# Patient Record
Sex: FEMALE | Race: WHITE
[De-identification: names, ages, dates, MRNs, and addresses within clinical notes are randomized per-mention and may not be internally consistent; named-entity substitution may affect disease eponyms.]

---

## 2017-04-26 ENCOUNTER — HOSPITAL ENCOUNTER (OUTPATIENT)
Dept: HOSPITAL 61 - KCIC MRI | Age: 67
Discharge: HOME | End: 2017-04-26
Attending: PODIATRIST
Payer: MEDICARE
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
## 2017-04-26 DIAGNOSIS — M84.375A: Primary | ICD-10-CM
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
## 2017-04-26 PROCEDURE — 73718 MRI LOWER EXTREMITY W/O DYE: CPT

## 2017-04-26 NOTE — [facility]
PROCEDURE 

MRI study of the left foot without contrast 

 

HISTORY 

Metatarsal pain since March 2017. Swelling between the 3rd and 5th digits.

No known injury. 

 

TECHNIQUE 

Noncontrast MRI sequences of the left midfoot were performed in all 3 

planes. 

 

COMPARISON 

None available. 

 

FINDINGS 

There is diffuse edema of the 3rd metatarsal bone with relative sparing of

the proximal and distal epiphysis. There is a healing stress fracture of 

the distal shaft of the 3rd metatarsal bone with periosteal reaction. 

There is associated soft tissue edema. Alignment is normal. There is mild 

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 

joint. There is mild primary degenerative osteoarthritis of the 1st and 

4th tarsal metatarsal joints. The flexor and extensor tendons are intact. 

No tenosynovitis is seen. 

 

IMPRESSION 

There is a nondisplaced subacute healing stress fracture of the distal 

shaft of the 3rd metatarsal bone. There is diffuse edema of the metatarsal

bone with relative sparing of the proximal and distal epiphysis. 

 

Electronically signed by: Shay Mckeon MD (Apr 26, 2017 15:28:21)

## 2017-12-08 ENCOUNTER — HOSPITAL ENCOUNTER (OUTPATIENT)
Dept: HOSPITAL 61 - KCIC MAMMO | Age: 67
Discharge: HOME | End: 2017-12-08
Attending: OBSTETRICS & GYNECOLOGY
Payer: MEDICARE

## 2017-12-08 ENCOUNTER — HOSPITAL ENCOUNTER (OUTPATIENT)
Dept: HOSPITAL 61 - KCIC MRI | Age: 67
Discharge: HOME | End: 2017-12-08
Attending: ORTHOPAEDIC SURGERY
Payer: MEDICARE

## 2017-12-08 DIAGNOSIS — Z12.31: Primary | ICD-10-CM

## 2017-12-08 DIAGNOSIS — M75.101: Primary | ICD-10-CM

## 2017-12-08 PROCEDURE — 73221 MRI JOINT UPR EXTREM W/O DYE: CPT

## 2017-12-08 PROCEDURE — 77063 BREAST TOMOSYNTHESIS BI: CPT

## 2017-12-08 NOTE — [facility]
MR of the right shoulder

 

Indication: Right shoulder pain. Patient fell October 2017. Decreased 

range of motion.

 

Technique: Standard multiplanar sequences are obtained.

 

Findings:

 

Acromioclavicular joint: Mildly degenerative. No significant undersurface 

mass effect.

 

Rotator cuff: Thickening and hyperintense signal compatible with 

tendinosis. Small linear undersurface tear of the supraspinatus tendon, 

crosses 50 percent of the tendon with. There is a very tiny component of 

this tear which appears to extend all the way through to the bursal 

surface where it measures 1 mm thick, coronal series 8, image 10. No 

retraction. Mild fluid in the subdeltoid bursa.

 

Glenohumeral cartilage: No acute defect or advanced DJD.

Fluid: Small amount of joint fluid.

Labrum: Posterosuperior labral tear.

 

Biceps tendon: Mild tendinosis.

 

Bones: No lesion or acute fracture.

 

Soft tissue: No acute findings.

 

Impression:

1. Rotator cuff tendinosis. There is a small undersurface tear of the 

supraspinatus tendon, the bulk of which is about 50 percent deep. However,

there is a very narrow 1 mm component which does appear to extend all 

full-thickness through the bursal surface tissue. No retraction.

2. Small posterosuperior labral tear.

3. Biceps tendinosis.

 

Electronically signed by: Jordy Queen MD (12/8/2017 3:25 PM) [facility]

## 2017-12-08 NOTE — [facility]
DATE: 12/8/2017



EXAM: MAMMO VIRGILIO SCREENING BILATERAL



HISTORY: Routine screening



COMPARISON: 7/28/2016



This study was interpreted with the benefit of Computerized Aided Detection

(CAD).



The breast parenchyma shows scattered fibroglandular densities. Breast

parenchyma level B.



FINDINGS: 2-D and 3-D tomosynthesis imaging was performed in CC and MLO

projections. No new or enlarging breast densities are seen. Unchanged benign

type calcifications are present. No suspicious microcalcifications have

developed.  





IMPRESSION: Stable mammograms without evidence of malignancy.







BI-RADS CATEGORY: 2 BENIGN FINDING(S)



RECOMMENDED FOLLOW-UP: 12M 12 MONTH FOLLOW-UP



PQRS compliance statement: Patient information was entered into a reminder

system with a target due date     for the next mammogram.



Mammography is a sensitive method for finding small breast cancers, but it

does not detect them all and is not a substitute for careful clinical

examination.  A negative mammogram does not negate a clinically suspicious

finding and should not result in delay in biopsying a clinically suspicious

abnormality.



"Our facility is accredited by the American College of [employer] Mammography

Program."

## 2017-12-19 ENCOUNTER — HOSPITAL ENCOUNTER (OUTPATIENT)
Dept: HOSPITAL 61 - KCIC | Age: 67
Discharge: HOME | End: 2017-12-19
Attending: FAMILY MEDICINE
Payer: MEDICARE

## 2017-12-19 DIAGNOSIS — J43.9: Primary | ICD-10-CM

## 2017-12-19 PROCEDURE — 71020: CPT

## 2017-12-19 NOTE — [facility]
CHEST PA   LATERAL

 

History: Chronic cough

 

Comparison: July 21, 2016

 

Findings:

2 views of the chest are submitted. 

There is again likely emphysema. Heart size is stable, within normal 

limits. Previously seen left upper lobe infiltrate is no longer present. 

The there is no new infiltrate or pleural fluid.

 

Impression: 

 

1.  There is no lobar infiltrate.

2. There is emphysema.

3. CT would be more sensitive for detection of pulmonary nodules.

 

Electronically signed by: Gurpreet Babb MD (12/19/2017 4:38 PM) 

[facility]

## 2018-09-27 ENCOUNTER — HOSPITAL ENCOUNTER (OUTPATIENT)
Dept: HOSPITAL 61 - KCIC DEXA | Age: 68
Discharge: HOME | End: 2018-09-27
Attending: PHYSICIAN ASSISTANT
Payer: MEDICARE

## 2018-09-27 DIAGNOSIS — Z13.820: Primary | ICD-10-CM

## 2018-09-27 DIAGNOSIS — J43.9: ICD-10-CM

## 2018-09-27 DIAGNOSIS — Z78.0: ICD-10-CM

## 2018-09-27 PROCEDURE — 77080 DXA BONE DENSITY AXIAL: CPT

## 2018-09-27 NOTE — [facility]
Bone densitometry 9/27/2018 11:59 AM

 

Indication: Osteoporosis screening exam. Postmenopausal.

 

Comparison Study: None.

 

Discussion:   Bone Densitometry was performed with dual photon absorption 

of the lumbar spine and left proximal femur.

 

Lumbar Spine:  Bone average density is 0.988g/cm2 for L1-L4. T-Score is 

-0.5.  

 

Left femoral neck:     Bone average density is 0.965g/cm2.  T-Score is  

0.2. 

 

IMPRESSION: Normal bone mineral density 

 

Note: Definitions established by the World Health Organization:

   Normal: T-score is -1.0 or above.

   Osteopenia: T-score is between -1.0 and -2.5.

   Osteoporosis: T-score is -2.5 or below. 

 

 

 

 

Electronically signed by: Johnson Monreal MD (9/27/2018 2:44 PM) [facility]

## 2019-09-10 ENCOUNTER — HOSPITAL ENCOUNTER (OUTPATIENT)
Dept: HOSPITAL 61 - KCIC MAMMO | Age: 69
Discharge: HOME | End: 2019-09-10
Attending: OBSTETRICS & GYNECOLOGY
Payer: MEDICARE

## 2019-09-10 DIAGNOSIS — Z12.31: Primary | ICD-10-CM

## 2019-09-10 DIAGNOSIS — N64.89: ICD-10-CM

## 2019-09-10 PROCEDURE — 77067 SCR MAMMO BI INCL CAD: CPT

## 2019-09-10 PROCEDURE — 77063 BREAST TOMOSYNTHESIS BI: CPT

## 2019-09-10 NOTE — [facility]
EXAM: Bilateral digital screening mammogram with tomosynthesis.

 

HISTORY: 69-year-old female presents for screening mammography.

 

TECHNIQUE: Full-field digital craniocaudal and mediolateral oblique 2D and

3D tomosynthesis images of both breasts are obtained for evaluation. 

Computer aided detection with b3 bioD software version 9.3 was applied.

 

COMPARISON: 12/8/2017 and 10/21/2015

 

BREAST PARENCHYMAL DENSITY: Level B - Scattered fibroglandular densities.

 

FINDINGS: There is no new suspicious mass, microcalcification or region of

architectural distortion. There is stable slight retraction of the right 

nipple. The nearly 4 year course stability favors benignity.

 

IMPRESSION: BI-RADS Category 2: Benign finding(s). 

 

RECOMMENDATION: Annual mammography is recommended.

 

If your mammogram demonstrates that you have dense breast tissue, which 

could hide abnormalities, and if you have other risk factors for breast 

cancer that have been identified, you might benefit from supplemental 

screening tests that may be suggested by your ordering physician.  Dense 

breast tissue, in and of itself, is a relatively common condition.  This 

information is not provided to cause undue concern, but rather to raise 

your awareness and to promote discussion with your physician regarding the

presence of other risk factors, in addition to dense breast tissue. A 

report of your mammography results will be sent to you and your physician.

You should contact your physician if you have any questions or concerns 

regarding this report.  

 

Mammography is a sensitive method for finding small breast cancers, but it

does not detect them all and is not a substitute for careful clinical 

examination.  A negative mammogram does not negate a clinically suspicious

finding and should not result in delay in biopsying a clinically 

suspicious abnormality.

 

PQRS compliance statement -  Patient information was entered into a 

reminder system with a target due date for the next mammogram. 

 

"Our facility is accredited by the American College of Radiology 

Mammography Program."

 

Electronically signed by: Amira Ayala MD (9/10/2019 3:15 PM) [facility]

## 2022-04-25 ENCOUNTER — HOSPITAL ENCOUNTER (EMERGENCY)
Dept: HOSPITAL 61 - ER | Age: 72
LOS: 1 days | Discharge: HOME | End: 2022-04-26
Payer: MEDICARE

## 2022-04-25 VITALS — BODY MASS INDEX: 28.3 KG/M2 | WEIGHT: 180.34 LBS | HEIGHT: 67 IN

## 2022-04-25 DIAGNOSIS — R11.2: ICD-10-CM

## 2022-04-25 DIAGNOSIS — R42: Primary | ICD-10-CM

## 2022-04-25 LAB
ALBUMIN SERPL-MCNC: 3.6 G/DL (ref 3.4–5)
ALBUMIN/GLOB SERPL: 1.1 {RATIO} (ref 1–1.7)
ALP SERPL-CCNC: 88 U/L (ref 46–116)
ALT SERPL-CCNC: 55 U/L (ref 14–59)
ANION GAP SERPL CALC-SCNC: 5 MMOL/L (ref 6–14)
AST SERPL-CCNC: 36 U/L (ref 15–37)
BASOPHILS # BLD AUTO: 0.1 X10^3/UL (ref 0–0.2)
BASOPHILS NFR BLD: 1 % (ref 0–3)
BILIRUB SERPL-MCNC: 0.2 MG/DL (ref 0.2–1)
BUN SERPL-MCNC: 27 MG/DL (ref 7–20)
BUN/CREAT SERPL: 30 (ref 6–20)
CALCIUM SERPL-MCNC: 8.3 MG/DL (ref 8.5–10.1)
CHLORIDE SERPL-SCNC: 107 MMOL/L (ref 98–107)
CO2 SERPL-SCNC: 28 MMOL/L (ref 21–32)
CREAT SERPL-MCNC: 0.9 MG/DL (ref 0.6–1)
EOSINOPHIL NFR BLD: 0.2 X10^3/UL (ref 0–0.7)
EOSINOPHIL NFR BLD: 3 % (ref 0–3)
ERYTHROCYTE [DISTWIDTH] IN BLOOD BY AUTOMATED COUNT: 14.1 % (ref 11.5–14.5)
GFR SERPLBLD BASED ON 1.73 SQ M-ARVRAT: 61.5 ML/MIN
GLUCOSE SERPL-MCNC: 133 MG/DL (ref 70–99)
HCT VFR BLD CALC: 39.5 % (ref 36–47)
HGB BLD-MCNC: 13.4 G/DL (ref 12–15.5)
LIPASE: 75 U/L (ref 73–393)
LYMPHOCYTES # BLD: 1.6 X10^3/UL (ref 1–4.8)
LYMPHOCYTES NFR BLD AUTO: 25 % (ref 24–48)
MCH RBC QN AUTO: 31 PG (ref 25–35)
MCHC RBC AUTO-ENTMCNC: 34 G/DL (ref 31–37)
MCV RBC AUTO: 92 FL (ref 79–100)
MONO #: 0.4 X10^3/UL (ref 0–1.1)
MONOCYTES NFR BLD: 6 % (ref 0–9)
NEUT #: 4.2 X10^3/UL (ref 1.8–7.7)
NEUTROPHILS NFR BLD AUTO: 65 % (ref 31–73)
PLATELET # BLD AUTO: 159 X10^3/UL (ref 140–400)
POTASSIUM SERPL-SCNC: 4.2 MMOL/L (ref 3.5–5.1)
PROT SERPL-MCNC: 6.9 G/DL (ref 6.4–8.2)
RBC # BLD AUTO: 4.31 X10^6/UL (ref 3.5–5.4)
SODIUM SERPL-SCNC: 140 MMOL/L (ref 136–145)
WBC # BLD AUTO: 6.4 X10^3/UL (ref 4–11)

## 2022-04-25 PROCEDURE — 96361 HYDRATE IV INFUSION ADD-ON: CPT

## 2022-04-25 PROCEDURE — 80307 DRUG TEST PRSMV CHEM ANLYZR: CPT

## 2022-04-25 PROCEDURE — 36415 COLL VENOUS BLD VENIPUNCTURE: CPT

## 2022-04-25 PROCEDURE — 83690 ASSAY OF LIPASE: CPT

## 2022-04-25 PROCEDURE — 96374 THER/PROPH/DIAG INJ IV PUSH: CPT

## 2022-04-25 PROCEDURE — 96372 THER/PROPH/DIAG INJ SC/IM: CPT

## 2022-04-25 PROCEDURE — 80053 COMPREHEN METABOLIC PANEL: CPT

## 2022-04-25 PROCEDURE — 99284 EMERGENCY DEPT VISIT MOD MDM: CPT

## 2022-04-25 PROCEDURE — 85025 COMPLETE CBC W/AUTO DIFF WBC: CPT

## 2022-04-25 PROCEDURE — 81001 URINALYSIS AUTO W/SCOPE: CPT

## 2022-04-26 VITALS — SYSTOLIC BLOOD PRESSURE: 124 MMHG | DIASTOLIC BLOOD PRESSURE: 78 MMHG

## 2022-04-26 LAB
AMPHETAMINE/METHAMPHETAMINE: (no result)
BACTERIA #/AREA URNS HPF: 0 /HPF
BARBITURATES UR-MCNC: (no result) UG/ML
BENZODIAZ UR-MCNC: (no result) UG/L
CANNABINOIDS UR-MCNC: (no result) UG/L
COCAINE UR-MCNC: (no result) NG/ML
METHADONE SERPL-MCNC: (no result) NG/ML
OPIATES UR-MCNC: (no result) NG/ML
PCP SERPL-MCNC: (no result) MG/DL
RBC #/AREA URNS HPF: 0 /HPF (ref 0–2)
WBC #/AREA URNS HPF: (no result) /HPF (ref 0–4)

## 2022-04-26 NOTE — PHYS DOC
Past Medical History


Past Surgical History:  No Surgical History





General Adult


EDM:


Chief Complaint:  NAUSEA/VOMITING/DIARRHEA





HPI:


HPI:





Patient is a 72  year old female states that she took a edible CBD gummy that 

was given by her neighbor for her pain states that afterwards she started to 

feel lightheaded and started having intractable vomiting.  Patient denies any 

abdominal pain.  Patient states that she did have a pizza no other sick 

contacts.  Patient states that she usually takes her own CBD supplements but was

given 1 by her neighbor which she does not know the name brand to.





Review of Systems:


Review of Systems:


Constitutional:   Denies fever or chills. []


Eyes:   Denies change in visual acuity. []


HENT:   Denies nasal congestion or sore throat. [] 


Respiratory:   Denies cough or shortness of breath. [] 


Cardiovascular: Lightheadedness denies chest pain or edema. [] 


GI:   Denies abdominal pain, nausea, vomiting, bloody stools or diarrhea. [] 


: Nausea vomiting denies dysuria. [] 


Musculoskeletal:   Denies back pain or joint pain. [] 


Integument:   Denies rash. [] 


Neurologic:   Denies headache, focal weakness or sensory changes. [] 


Endocrine:   Denies polyuria or polydipsia. [] 


Lymphatic:  Denies swollen glands. [] 


Psychiatric:  Denies depression or anxiety. []





Heart Score:


C/O Chest Pain:  No


Risk Factors:


Risk Factors:  DM, Current or recent (<one month) smoker, HTN, HLP, family 

history of CAD, obesity.


Risk Scores:


Score 0 - 3:  2.5% MACE over next 6 weeks - Discharge Home


Score 4 - 6:  20.3% MACE over next 6 weeks - Admit for Clinical Observation


Score 7 - 10:  72.7% MACE over next 6 weeks - Early Invasive Strategies





Current Medications:





Current Medications








 Medications


  (Trade)  Dose


 Ordered  Sig/Josie  Start Time


 Stop Time Status Last Admin


Dose Admin


 


 Haloperidol


 Lactate


  (Haldol Inj)  2 mg  1X  ONCE  4/25/22 22:15


 4/25/22 22:16 UNV 4/25/22 22:29


2 MG


 


 Lorazepam


  (Ativan Inj)  1 mg  1X  ONCE  4/25/22 22:15


 4/25/22 22:16 UNV 4/25/22 22:29


1 MG


 


 Ondansetron HCl


  (Zofran)  4 mg  1X  ONCE  4/25/22 22:00


 4/25/22 22:03 DC  





 


 Sodium Chloride  500 ml @ 


 500 mls/hr  1X  ONCE  4/25/22 22:00


 4/25/22 22:59 UNV 4/25/22 22:30


500 MLS/HR











Physical Exam:


PE:





Constitutional: Well developed, well nourished, moderate distress ill-appearing


HENT: Normocephalic, atraumatic, bilateral external ears normal, oropharynx 

moist, no oral exudates, nose normal. []


Eyes: PERRLA, EOMI, conjunctiva normal, no discharge. [] 


Neck: Normal range of motion, no tenderness, supple, no stridor. [] 


Cardiovascular:Heart rate regular rhythm, no murmur []


Lungs & Thorax:  Bilateral breath sounds clear to auscultation []


Abdomen: Actively vomit bowel sounds normal, soft, no tenderness, no masses, no 

pulsatile masses. [] 


Skin: Warm, dry, no erythema, no rash. [] 


Back: No tenderness, no CVA tenderness. [] 


Extremities: No tenderness, no cyanosis, no clubbing, ROM intact, no edema. [] 


Neurologic: Alert and oriented X 3, normal motor function, normal sensory 

function, no focal deficits noted. []


Psychologic: Affect normal, judgement normal, mood normal. []





Current Patient Data:


Labs:





                                Laboratory Tests








Test


 4/25/22


22:15 4/25/22


23:20 4/26/22


01:34


 


White Blood Count


 6.4 x10^3/uL


(4.0-11.0) 


 





 


Red Blood Count


 4.31 x10^6/uL


(3.50-5.40) 


 





 


Hemoglobin


 13.4 g/dL


(12.0-15.5) 


 





 


Hematocrit


 39.5 %


(36.0-47.0) 


 





 


Mean Corpuscular Volume


 92 fL ()


 


 





 


Mean Corpuscular Hemoglobin 31 pg (25-35)    


 


Mean Corpuscular Hemoglobin


Concent 34 g/dL


(31-37) 


 





 


Red Cell Distribution Width


 14.1 %


(11.5-14.5) 


 





 


Platelet Count


 159 x10^3/uL


(140-400) 


 





 


Neutrophils (%) (Auto) 65 % (31-73)    


 


Lymphocytes (%) (Auto) 25 % (24-48)    


 


Monocytes (%) (Auto) 6 % (0-9)    


 


Eosinophils (%) (Auto) 3 % (0-3)    


 


Basophils (%) (Auto) 1 % (0-3)    


 


Neutrophils # (Auto)


 4.2 x10^3/uL


(1.8-7.7) 


 





 


Lymphocytes # (Auto)


 1.6 x10^3/uL


(1.0-4.8) 


 





 


Monocytes # (Auto)


 0.4 x10^3/uL


(0.0-1.1) 


 





 


Eosinophils # (Auto)


 0.2 x10^3/uL


(0.0-0.7) 


 





 


Basophils # (Auto)


 0.1 x10^3/uL


(0.0-0.2) 


 





 


Sodium Level


 


 140 mmol/L


(136-145) 





 


Potassium Level


 


 4.2 mmol/L


(3.5-5.1) 





 


Chloride Level


 


 107 mmol/L


() 





 


Carbon Dioxide Level


 


 28 mmol/L


(21-32) 





 


Anion Gap  5 (6-14)  L 


 


Blood Urea Nitrogen


 


 27 mg/dL


(7-20)  H 





 


Creatinine


 


 0.9 mg/dL


(0.6-1.0) 





 


Estimated GFR


(Cockcroft-Gault) 


 61.5  


 





 


BUN/Creatinine Ratio  30 (6-20)  H 


 


Glucose Level


 


 133 mg/dL


(70-99)  H 





 


Calcium Level


 


 8.3 mg/dL


(8.5-10.1)  L 





 


Total Bilirubin


 


 0.2 mg/dL


(0.2-1.0) 





 


Aspartate Amino Transferase


(AST) 


 36 U/L (15-37)


 





 


Alanine Aminotransferase (ALT)


 


 55 U/L (14-59)


 





 


Alkaline Phosphatase


 


 88 U/L


() 





 


Total Protein


 


 6.9 g/dL


(6.4-8.2) 





 


Albumin


 


 3.6 g/dL


(3.4-5.0) 





 


Albumin/Globulin Ratio  1.1 (1.0-1.7)   


 


Lipase


 


 75 U/L


() 





 


Urine Collection Type   Unknown  


 


Urine Color (Auto)   Colorless  


 


Urine Turbidity   Clear  


 


Urine pH (Auto)


 


 


 6.5 (<5.0-8.0)





 


Urine Specific Gravity


 


 


 1.012


(1.000-1.030)


 


Urine Protein (Auto)


 


 


 Negative mg/dL


(Negative)


 


Urine Glucose (Auto)(UA)


 


 


 Negative mg/dL


(Negative)


 


Urine Ketones (Auto)


 


 


 Negative mg/dL


(Negative)


 


Urine Blood (Auto)


 


 


 Negative


(Negative)


 


Urine Nitrite (Auto)


 


 


 Negative


(Negative)


 


Urine Bilirubin (Auto)


 


 


 Negative


(Negative)


 


Urine Urobilinogen (Auto)


 


 


 Normal mg/dL


(Normal)


 


Urine Leukocyte Esterase


(Auto) 


 


 Moderate


(Negative)


 


Urine RBC   0 /HPF (0-2)  


 


Urine WBC


 


 


 5-10 /HPF


(0-4)


 


Urine Squamous Epithelial


Cells 


 


 Few /LPF  





 


Urine Bacteria


 


 


 0 /HPF (0-FEW)





 


Urine Mucus   Slight /LPF  


 


Urine Opiates Screen   Neg (NEG)  


 


Urine Methadone Screen   Neg (NEG)  


 


Urine Barbiturates   Neg (NEG)  


 


Urine Phencyclidine Screen   Neg (NEG)  


 


Urine


Amphetamine/Methamphetamine 


 


 Neg (NEG)  





 


Urine Benzodiazepines Screen   Neg (NEG)  


 


Urine Cocaine Screen   Neg (NEG)  


 


Urine Cannabinoids Screen   Pos (NEG)  


 


Urine Ethyl Alcohol   Neg (NEG)  





                                Laboratory Tests


4/25/22 22:15








                                Laboratory Tests


4/25/22 23:20








Vital Signs:





                                   Vital Signs








  Date Time  Temp Pulse Resp B/P (MAP) Pulse Ox O2 Delivery O2 Flow Rate FiO2


 


4/25/22 21:51 97.4 78 16 134/72 (92) 100 Room Air  





 97.4       











EKG:


EKG:


[]





Radiology/Procedures:


Radiology/Procedures:


[]





Course & Med Decision Making:


Course & Med Decision Making


Pertinent Labs and Imaging studies reviewed. (See chart for details)





[] Patient is resting comfortably.  Return precautions were discussed signs at 

the bedside.  Lab results were reviewed





Dragon Disclaimer:


Dragon Disclaimer:


This electronic medical record was generated, in whole or in part, using a voice

 recognition dictation system.





Departure


Departure


Referrals:  


BILL VALDES MD (PCP)











KECIA MCDANIEL DO                  Apr 26, 2022 02:03